# Patient Record
Sex: MALE | Race: WHITE | URBAN - METROPOLITAN AREA
[De-identification: names, ages, dates, MRNs, and addresses within clinical notes are randomized per-mention and may not be internally consistent; named-entity substitution may affect disease eponyms.]

---

## 2022-12-19 ENCOUNTER — OFFICE VISIT (OUTPATIENT)
Dept: URBAN - METROPOLITAN AREA CLINIC 26 | Facility: CLINIC | Age: 67
End: 2022-12-19
Payer: COMMERCIAL

## 2022-12-19 DIAGNOSIS — H25.13 AGE-RELATED NUCLEAR CATARACT, BILATERAL: Primary | ICD-10-CM

## 2022-12-19 DIAGNOSIS — H52.4 PRESBYOPIA: ICD-10-CM

## 2022-12-19 PROCEDURE — 99204 OFFICE O/P NEW MOD 45 MIN: CPT | Performed by: OPTOMETRIST

## 2022-12-19 ASSESSMENT — VISUAL ACUITY
OS: 20/25
OD: 20/40

## 2022-12-19 ASSESSMENT — KERATOMETRY
OD: 44.88
OS: 44.75

## 2022-12-19 ASSESSMENT — INTRAOCULAR PRESSURE
OD: 15
OS: 16

## 2023-02-10 ENCOUNTER — ADULT PHYSICAL (OUTPATIENT)
Dept: URBAN - METROPOLITAN AREA CLINIC 26 | Facility: CLINIC | Age: 68
End: 2023-02-10
Payer: COMMERCIAL

## 2023-02-10 DIAGNOSIS — Z01.818 ENCOUNTER FOR OTHER PREPROCEDURAL EXAMINATION: Primary | ICD-10-CM

## 2023-02-10 DIAGNOSIS — H25.13 AGE-RELATED NUCLEAR CATARACT, BILATERAL: ICD-10-CM

## 2023-02-10 PROCEDURE — 99203 OFFICE O/P NEW LOW 30 MIN: CPT | Performed by: PHYSICIAN ASSISTANT

## 2023-02-10 ASSESSMENT — PACHYMETRY
OS: 23.39
OS: 3.32
OD: 23.60
OD: 3.30

## 2023-02-15 ENCOUNTER — PRE-OPERATIVE VISIT (OUTPATIENT)
Dept: URBAN - METROPOLITAN AREA CLINIC 24 | Facility: CLINIC | Age: 68
End: 2023-02-15
Payer: COMMERCIAL

## 2023-02-15 DIAGNOSIS — H52.203 BILATERAL ASTIGMATISM: ICD-10-CM

## 2023-02-15 DIAGNOSIS — H04.123 DRY EYE SYNDROME OF BILATERAL LACRIMAL GLANDS: ICD-10-CM

## 2023-02-15 DIAGNOSIS — H43.393 OTHER VITREOUS OPACITIES, BILATERAL: ICD-10-CM

## 2023-02-15 DIAGNOSIS — H25.13 AGE-RELATED NUCLEAR CATARACT, BILATERAL: Primary | ICD-10-CM

## 2023-02-15 PROCEDURE — 99204 OFFICE O/P NEW MOD 45 MIN: CPT | Performed by: OPHTHALMOLOGY

## 2023-02-15 ASSESSMENT — INTRAOCULAR PRESSURE
OD: 16
OS: 16

## 2023-02-15 NOTE — IMPRESSION/PLAN
Impression: Age-related nuclear cataract, bilateral: H25.13. Plan: PLAN: (( AIM -0.25  OU: TRIMOXI OU, NO ORA OU, NO LRI OU: Declined AMP, NOTE LENS TYPE: [id number] )) Discussed cataract diagnosis with the patient. Appropriate testing ordered for cataract diagnosis prior to Preop. Risks and benefits of surgical treatment were discussed and understood. Patient desires surgical treatment. Premium options discussed but patient declines and is ok with using glasses after surgery. Patient desires to proceed with surgery OU, OD FIRST. Both eyes examined, medically necessary due to impact in activities of daily living. Discussed with pt the need for glasses after surgery. Discussed there is a chance of developing capsular haze after surgery, which may be corrected with laser/yag after surgery. Discussed current level of vision with patient, patient complaining of difficulty driving at night and wishes to proceed with cataract surgery. Discussed monovision and patient does not want, understands may need glasses full-time for best vision.
Impression: Bilateral astigmatism: H52.203. Plan: Declined AMP, Discussed with patient that will need glasses after surgery. Discussed with patient, understands this may limit vision after surgery. Also discussed unmasking of astigmatism.
Impression: Dry eye syndrome of bilateral lacrimal glands: H04.123. Plan: artificial tears OU. Discussed with patient, understands this may limit vision after surgery.
Impression: Other vitreous opacities, bilateral: H43.393. Plan: Discussed signs and symptoms of retinal detachment/pathology  (flashes,floaters, curtain - distortions) as precaution. Patient instructed to call or return to clinic if condition gets worse. Discussed with patient, understands this may limit vision after surgery.
DISPLAY PLAN FREE TEXT

## 2023-02-22 ENCOUNTER — SURGERY (OUTPATIENT)
Dept: URBAN - METROPOLITAN AREA SURGERY 12 | Facility: SURGERY | Age: 68
End: 2023-02-22
Payer: COMMERCIAL

## 2023-02-22 DIAGNOSIS — H25.13 AGE-RELATED NUCLEAR CATARACT, BILATERAL: Primary | ICD-10-CM

## 2023-02-22 PROCEDURE — 66984 XCAPSL CTRC RMVL W/O ECP: CPT | Performed by: OPHTHALMOLOGY

## 2023-02-23 ENCOUNTER — POST-OPERATIVE VISIT (OUTPATIENT)
Dept: URBAN - METROPOLITAN AREA CLINIC 26 | Facility: CLINIC | Age: 68
End: 2023-02-23
Payer: COMMERCIAL

## 2023-02-23 DIAGNOSIS — Z48.810 ENCOUNTER FOR SURGICAL AFTERCARE FOLLOWING SURGERY ON A SENSE ORGAN: Primary | ICD-10-CM

## 2023-02-23 PROCEDURE — 99024 POSTOP FOLLOW-UP VISIT: CPT | Performed by: OPTOMETRIST

## 2023-02-23 ASSESSMENT — INTRAOCULAR PRESSURE: OD: 24

## 2023-02-23 NOTE — IMPRESSION/PLAN
Impression: S/P Cataract Extraction by phacoemulsification with IOL placement OD - 1 Day. Encounter for surgical aftercare following surgery on a sense organ  Z48.330.  Plan: monitor

## 2023-03-01 ENCOUNTER — POST-OPERATIVE VISIT (OUTPATIENT)
Dept: URBAN - METROPOLITAN AREA CLINIC 26 | Facility: CLINIC | Age: 68
End: 2023-03-01
Payer: COMMERCIAL

## 2023-03-01 DIAGNOSIS — Z48.810 ENCOUNTER FOR SURGICAL AFTERCARE FOLLOWING SURGERY ON A SENSE ORGAN: Primary | ICD-10-CM

## 2023-03-01 PROCEDURE — 99024 POSTOP FOLLOW-UP VISIT: CPT | Performed by: OPTOMETRIST

## 2023-03-01 ASSESSMENT — VISUAL ACUITY
OD: 20/20
OS: 20/40

## 2023-03-01 ASSESSMENT — INTRAOCULAR PRESSURE
OD: 19
OS: 20

## 2023-03-01 NOTE — IMPRESSION/PLAN
Impression: S/P Cataract Extraction by phacoemulsification with IOL placement OD - 7 Days. Encounter for surgical aftercare following surgery on a sense organ  Z48.810.  Plan: ok to proceed with 2nd eye
continue Art tears 1 gt QID OU

## 2023-03-08 ENCOUNTER — SURGERY (OUTPATIENT)
Dept: URBAN - METROPOLITAN AREA SURGERY 12 | Facility: SURGERY | Age: 68
End: 2023-03-08
Payer: COMMERCIAL

## 2023-03-08 DIAGNOSIS — H25.12 AGE-RELATED NUCLEAR CATARACT, LEFT EYE: Primary | ICD-10-CM

## 2023-03-08 PROCEDURE — 66984 XCAPSL CTRC RMVL W/O ECP: CPT | Performed by: OPHTHALMOLOGY

## 2023-03-09 ENCOUNTER — POST-OPERATIVE VISIT (OUTPATIENT)
Dept: URBAN - METROPOLITAN AREA CLINIC 26 | Facility: CLINIC | Age: 68
End: 2023-03-09
Payer: COMMERCIAL

## 2023-03-09 DIAGNOSIS — Z96.1 PRESENCE OF INTRAOCULAR LENS: Primary | ICD-10-CM

## 2023-03-09 PROCEDURE — 99024 POSTOP FOLLOW-UP VISIT: CPT | Performed by: OPTOMETRIST

## 2023-03-09 ASSESSMENT — INTRAOCULAR PRESSURE: OS: 25

## 2023-03-09 NOTE — IMPRESSION/PLAN
Impression:  Presence of intraocular lens  Z96.1. Excellent post op course   Post operative instructions reviewed - Condition is improving - Plan: 1gtt brimonidine inserted in office. rtc as scheduled with Dr. Scout Naidu. refract next visit. IOP check next visit.

## 2023-03-29 ENCOUNTER — POST-OPERATIVE VISIT (OUTPATIENT)
Dept: URBAN - METROPOLITAN AREA CLINIC 26 | Facility: CLINIC | Age: 68
End: 2023-03-29
Payer: COMMERCIAL

## 2023-03-29 DIAGNOSIS — Z96.1 PRESENCE OF INTRAOCULAR LENS: Primary | ICD-10-CM

## 2023-03-29 PROCEDURE — 99024 POSTOP FOLLOW-UP VISIT: CPT | Performed by: OPTOMETRIST

## 2023-03-29 ASSESSMENT — INTRAOCULAR PRESSURE
OD: 17
OS: 16

## 2023-03-29 ASSESSMENT — VISUAL ACUITY
OD: 20/20
OS: 20/20

## 2023-03-29 NOTE — IMPRESSION/PLAN
Impression: S/P Cataract Extraction by phacoemulsification with IOL placement OS - 21 Days. Presence of intraocular lens  Z96.1. Plan: ok to use OTC readers as needed.   recommend +2.00
continue Art tears as needed OU
recommen yearly eye exam

## 2024-05-06 ENCOUNTER — OFFICE VISIT (OUTPATIENT)
Dept: URBAN - METROPOLITAN AREA CLINIC 26 | Facility: CLINIC | Age: 69
End: 2024-05-06
Payer: COMMERCIAL

## 2024-05-06 DIAGNOSIS — H04.123 DRY EYE SYNDROME OF BILATERAL LACRIMAL GLANDS: ICD-10-CM

## 2024-05-06 DIAGNOSIS — H52.4 PRESBYOPIA: ICD-10-CM

## 2024-05-06 DIAGNOSIS — H43.393 OTHER VITREOUS OPACITIES, BILATERAL: Primary | ICD-10-CM

## 2024-05-06 DIAGNOSIS — H26.493 OTHER SECONDARY CATARACT, BILATERAL: ICD-10-CM

## 2024-05-06 PROCEDURE — 92014 COMPRE OPH EXAM EST PT 1/>: CPT | Performed by: OPTOMETRIST

## 2024-05-06 PROCEDURE — 92134 CPTRZ OPH DX IMG PST SGM RTA: CPT | Performed by: OPTOMETRIST

## 2024-05-06 ASSESSMENT — KERATOMETRY
OD: 45.13
OS: 44.50

## 2024-05-06 ASSESSMENT — VISUAL ACUITY
OD: 20/20
OS: 20/20

## 2024-05-06 ASSESSMENT — INTRAOCULAR PRESSURE
OS: 12
OD: 14

## 2025-05-06 ENCOUNTER — OFFICE VISIT (OUTPATIENT)
Dept: URBAN - METROPOLITAN AREA CLINIC 26 | Facility: CLINIC | Age: 70
End: 2025-05-06
Payer: COMMERCIAL

## 2025-05-06 DIAGNOSIS — H40.013 OPEN ANGLE WITH BORDERLINE FINDINGS, LOW RISK, BILATERAL: Primary | ICD-10-CM

## 2025-05-06 DIAGNOSIS — H43.393 OTHER VITREOUS OPACITIES, BILATERAL: ICD-10-CM

## 2025-05-06 DIAGNOSIS — H26.493 OTHER SECONDARY CATARACT, BILATERAL: ICD-10-CM

## 2025-05-06 PROCEDURE — 92133 CPTRZD OPH DX IMG PST SGM ON: CPT | Performed by: OPTOMETRIST

## 2025-05-06 PROCEDURE — 92134 CPTRZ OPH DX IMG PST SGM RTA: CPT | Performed by: OPTOMETRIST

## 2025-05-06 PROCEDURE — 92014 COMPRE OPH EXAM EST PT 1/>: CPT | Performed by: OPTOMETRIST

## 2025-05-06 ASSESSMENT — KERATOMETRY
OS: 44.75
OD: 45.00

## 2025-05-06 ASSESSMENT — VISUAL ACUITY
OD: 20/20
OS: 20/20

## 2025-05-06 ASSESSMENT — INTRAOCULAR PRESSURE
OS: 15
OD: 15